# Patient Record
Sex: MALE | Race: BLACK OR AFRICAN AMERICAN | NOT HISPANIC OR LATINO | Employment: FULL TIME | URBAN - METROPOLITAN AREA
[De-identification: names, ages, dates, MRNs, and addresses within clinical notes are randomized per-mention and may not be internally consistent; named-entity substitution may affect disease eponyms.]

---

## 2022-05-29 PROCEDURE — 99283 EMERGENCY DEPT VISIT LOW MDM: CPT | Mod: 25

## 2022-05-29 PROCEDURE — 11740 EVACUATION SUBUNGUAL HMTMA: CPT | Mod: LT

## 2022-05-30 ENCOUNTER — HOSPITAL ENCOUNTER (EMERGENCY)
Facility: CLINIC | Age: 37
Discharge: HOME | End: 2022-05-30
Attending: EMERGENCY MEDICINE | Admitting: EMERGENCY MEDICINE
Payer: COMMERCIAL

## 2022-05-30 ENCOUNTER — APPOINTMENT (OUTPATIENT)
Dept: GENERAL RADIOLOGY | Facility: CLINIC | Age: 37
End: 2022-05-30
Attending: EMERGENCY MEDICINE
Payer: COMMERCIAL

## 2022-05-30 VITALS
SYSTOLIC BLOOD PRESSURE: 154 MMHG | RESPIRATION RATE: 16 BRPM | HEART RATE: 57 BPM | DIASTOLIC BLOOD PRESSURE: 85 MMHG | OXYGEN SATURATION: 99 % | TEMPERATURE: 98.1 F

## 2022-05-30 DIAGNOSIS — S60.10XA SUBUNGUAL HEMATOMA OF DIGIT OF HAND, INITIAL ENCOUNTER: ICD-10-CM

## 2022-05-30 DIAGNOSIS — S67.10XA CRUSHING INJURY OF FINGER, INITIAL ENCOUNTER: ICD-10-CM

## 2022-05-30 PROCEDURE — 73140 X-RAY EXAM OF FINGER(S): CPT | Mod: LT

## 2022-05-30 ASSESSMENT — ENCOUNTER SYMPTOMS
ARTHRALGIAS: 1
FEVER: 0

## 2022-05-30 NOTE — ED PROVIDER NOTES
History   Chief Complaint:  Hand Pain       HPI   Suly Whitfield is a 37 year old male presents with finger pain. Per patient's report, he slammed finger into the door accidentally at 6 pm yesterday. He has had worsening pain to left middle finger and swelling to his nail. No fever recorded at home.     Review of Systems   Constitutional: Negative for fever.   Musculoskeletal: Positive for arthralgias.   All other systems reviewed and are negative.    Allergies:  No Known Allergies    Medications:  No current outpatient medications on file.    Past Medical History:     No past medical history on file.    Past Surgical History:    No past surgical history on file.     Family History:    No family history on file.    Social History:  Presents accompanied.    Physical Exam     Patient Vitals for the past 24 hrs:   BP Temp Temp src Pulse Resp SpO2   05/30/22 0212 (!) 154/85 -- -- 57 16 99 %   05/29/22 2336 (!) 144/81 98.1  F (36.7  C) Oral 68 16 98 %       Physical Exam  Constitutional:  Oriented to person, place, and time. Well-appearing.  HENT:   Head:    Normocephalic.   Mouth/Throat:   Oropharynx is clear and moist.   Eyes:    EOM are normal. Pupils are equal, round, and reactive to light.   Neck:    Neck supple.   Cardiovascular:  Normal rate, regular rhythm and normal heart sounds.      Exam reveals no gallop and no friction rub.       No murmur heard.  Pulmonary/Chest:  Effort normal and breath sounds normal.      No respiratory distress. No wheezes. No rales.      No reproducible chest wall pain.  Abdominal:   Soft. No distension. No tenderness. No rebound and no guarding.   Musculoskeletal:  Normal range of motion. subungual hematoma to left middle finger. Nail: no laceration. No deformity. Full ROM of finger. General tenderness.  Neurological:   Alert and oriented to person, place, and time.           Moves all 4 extremities spontaneously. strength and sesation to left middle finger    Skin:    subungual  hematoma to left middle finger nail. No laceration.      Emergency Department Course   ECG  No results found for this or any previous visit.    Imaging:  Fingers XR, 2-3 views, left   Final Result   IMPRESSION: Anatomic alignment of the bones and joints of the left third digit. No fracture, dislocation, opaque foreign body or soft tissue gas.           Report per radiology      Procedures  Procedures:    PROCEDURE:Nail trephination  Indication: subungal hematoma  The risks and benefits of the procedure were discussed with the patient.   Narrative: The nail was cleaned with an alcohol swab.  An cautery was used to trephinate the nail.  A satisfactory amount of blood was expressed.  Pain was improved following the procedure.    Emergency Department Course:       Reviewed:  I reviewed nursing notes, vitals, past medical history and Care Everywhere    Assessments:   I obtained history and examined the patient as noted above.     Disposition:  The patient was discharged to home.     Impression & Plan       Medical Decision Making:  3-year-old male who came in complaining of finger injury.  It appears to be a subungual hematoma.  No laceration no concerns for nailbed injury that would require repair here.  I did not make an x-ray evaluate for fracture which is fortunately negative.  Did perform trephination please see procedure note.  He is told to use warm soaks keep wound covered continue ibuprofen.  Otherwise appropriate for outpatient management told to return for signs of infection redness swelling purulence any new symptoms or concerns    Diagnosis:    ICD-10-CM    1. Subungual hematoma of digit of hand, initial encounter  S60.10XA    2. Crushing injury of finger, initial encounter  S67.10XA        Scribe Disclosure:  I, Negrita Chaudhry, am serving as a scribe at 1:02 AM on 5/30/2022 to document services personally performed by Cory Washburn MD based on my observations and the provider's statements to me.             Cory Washburn MD  05/30/22 0632       Cory Washburn MD  05/30/22 0633

## 2022-05-30 NOTE — ED TRIAGE NOTES
Slammed finger in door yesterday. Fevers today. Tylenol 1hr PTA. Lac to left middle finger, nail bruised and swollen, still intact. Bleeding control. Numbness to finger.